# Patient Record
(demographics unavailable — no encounter records)

---

## 2025-01-14 NOTE — END OF VISIT
[FreeTextEntry3] :  All medical record entries made by the Scribe were at my, Dr. Bud Benedict, direction and personally dictated by me on 01/13/2025. I have reviewed the chart and agree that the record accurately reflects my personal performance of the history, physical exam, assessment and plan. I have also personally directed, reviewed and agreed with the chart. [Time Spent: ___ minutes] : I have spent [unfilled] minutes of time on the encounter which excludes teaching and separately reported services.

## 2025-01-14 NOTE — PHYSICAL EXAM
[No Acute Distress] : no acute distress [Normal Sclera/Conjunctiva] : normal sclera/conjunctiva [No Proptosis] : no proptosis [Normal Outer Ear/Nose] : the ears and nose were normal in appearance [Normal Oropharynx] : the oropharynx was normal [Clear to Auscultation] : lungs were clear to auscultation bilaterally [Normal Rate] : heart rate was normal [No Edema] : no peripheral edema [Soft] : abdomen soft [Spine Straight] : spine straight [No Stigmata of Cushings Syndrome] : no stigmata of Cushings Syndrome [Normal Gait] : normal gait [Normal Strength/Tone] : muscle strength and tone were normal [No Rash] : no rash [Normal Reflexes] : deep tendon reflexes were 2+ and symmetric [No Tremors] : no tremors [Oriented x3] : oriented to person, place, and time [Kyphosis] : no kyphosis present [Acanthosis Nigricans] : no acanthosis nigricans [de-identified] : Small nodularities palpated in the right thyroid lobe, regular borders.

## 2025-01-14 NOTE — PHYSICAL EXAM
[No Acute Distress] : no acute distress [Normal Sclera/Conjunctiva] : normal sclera/conjunctiva [No Proptosis] : no proptosis [Normal Outer Ear/Nose] : the ears and nose were normal in appearance [Normal Oropharynx] : the oropharynx was normal [Clear to Auscultation] : lungs were clear to auscultation bilaterally [Normal Rate] : heart rate was normal [No Edema] : no peripheral edema [Soft] : abdomen soft [Spine Straight] : spine straight [No Stigmata of Cushings Syndrome] : no stigmata of Cushings Syndrome [Normal Gait] : normal gait [Normal Strength/Tone] : muscle strength and tone were normal [No Rash] : no rash [Normal Reflexes] : deep tendon reflexes were 2+ and symmetric [No Tremors] : no tremors [Oriented x3] : oriented to person, place, and time [Kyphosis] : no kyphosis present [Acanthosis Nigricans] : no acanthosis nigricans [de-identified] : Small nodularities palpated in the right thyroid lobe, regular borders.

## 2025-01-14 NOTE — HISTORY OF PRESENT ILLNESS
[FreeTextEntry1] : 36 y/o F pt with Hx of Hashimoto hypothyroidism (dx in 2015, TPO Ab 202 in 2017) and thyroid nodules (dx in 2017).   11/17/2021 Pt presents today with /82 and BMI 30.95 for endocrine f/u. She is feeling okay with no acute complaints. Pt used to follow up with NP and presents today for regular visit.  She has gained 14 lbs in last 10 months.  Denies palpitations, weight loss and anxiety.   04/04/2023 Pt did not have any questions prior to the initiation of the telehealth visit.  CC: " I am feeling good and doing well. " Pt has recently seen her PCP Dr. Clayton, who increased pt's dose of levothyroxine to 100 mcg daily two months ago. She plans to have tubal ligation.  No speech, swallowing or breathing difficulties.   07/17/2024  Pt has /84 and BMI 30.62. No significant weight change.  CC: "I feel good." Pt follows up regularly with her PCP. She reports that she had been taking Levothyroxine 100 mcg for the past 3-4 weeks.  Small nodularities palpated in the right lobe. 01/31/19 biopsy 04/01/21 Thyroid US: right 6 mm hyperechoic, left 5 mm stable nodule. Isthmus mixed 0.8 cm nodule.   01/13/2025  Pt has /89 and BMI 30.62. No significant weight change.  CC: "I feel alright." Pt denies difficulty breathing, difficulty swallowing, voice changes, and weight changes. She takes her Levothyroxine regularly on an empty stomach.  - 07/17/24 TSH 2.73, free T4 1.3   [Medications verified as per pt on 01/13/2025] Current Medications: Levothyroxine 100 mcg daily  Imaging: - 04/01/21 Thyroid US: (Comparison to 03/05/20) 3 subcentimeter thyroid nodules. Findings: Isthmus, Normal Size in AP dimension 0.4 cm. Within the L side of the isthmus there is a mixed 0.8 x 0.4 x 0.5 cm nodule. R lobe measures 3.4 x 1.4 x 1.3 cm. There is a 6 x 5 x 6 mm stable hyperechoic nodule. L Lobe: Measures 2.6 x 0.8 x 1.3 cm. L Lower pole there is a hyperechoic 5 x 3 x 3 mm stable nodule. Impression: Normal thyroid sonogram containing an 8 mm mixed L isthmic nodule and b/l hyperechoic nodules that do not need follow-up.  - 01/31/19 FNA Biopsy by Dr. Guerrero from [facility] (ACC # [id number]): Hashimoto thyroiditis. R lobe midpole [place] II. L lobe mid pole [place] II.  - 12/06/18 Thyroid US: R lobe interpolar to lower pole solid nodule of 1.9 x 1.0 x 1.2 cm with irregular margins and L lobe a nodule in the interpolar to lower pole solid nodule 2.2 x 0.9 x 1.2 cm hypoechoic. Multiple benign lymph nodes are seen, parathyroid gland not visualized - 1/17/18 FNA Biopsy: Benign, consistent with lymphocytic Hashimoto's thyroiditis. - 11/24/17 Thyroid US: Right lobe: Hypoechoic upper pole nodule 0.7 x 0.4 x 0.5 cm; Mid pole solid hyperechoic nodule 0.6 x 0.6 x 0.6 cm; Mid pole solid complex nodule 2.0 x 1.9 x 1.7 cm. Nodules are more numerous and increase in size Left lobe: lower pole hypoechoic nodule 0.5 x 0.3 x 0.6 cm stable    Labs:  - 03/05/21: TSH 1.49, Free T4 1.4,  - 01/15/20: TSH 3.37, Free T4 1.4 - 10/30/19: TSH 5.73, Free T3 2.62, Free T4 1.2  - 01/16/19: TSH 4.64, Free T4 1.4 - 12/13/17: A1c 5.4%,.4 s.creat 0.7, TSH 3.92, Free T4 0.9, TPO ab 202, Thyroglobulin ab <1, , Cholesterol 216, LDL-c 118

## 2025-01-14 NOTE — HISTORY OF PRESENT ILLNESS
[FreeTextEntry1] : 36 y/o F pt with Hx of Hashimoto hypothyroidism (dx in 2015, TPO Ab 202 in 2017) and thyroid nodules (dx in 2017).   11/17/2021 Pt presents today with /82 and BMI 30.95 for endocrine f/u. She is feeling okay with no acute complaints. Pt used to follow up with NP and presents today for regular visit.  She has gained 14 lbs in last 10 months.  Denies palpitations, weight loss and anxiety.   04/04/2023 Pt did not have any questions prior to the initiation of the telehealth visit.  CC: " I am feeling good and doing well. " Pt has recently seen her PCP Dr. Clayton, who increased pt's dose of levothyroxine to 100 mcg daily two months ago. She plans to have tubal ligation.  No speech, swallowing or breathing difficulties.   07/17/2024  Pt has /84 and BMI 30.62. No significant weight change.  CC: "I feel good." Pt follows up regularly with her PCP. She reports that she had been taking Levothyroxine 100 mcg for the past 3-4 weeks.  Small nodularities palpated in the right lobe. 01/31/19 biopsy 04/01/21 Thyroid US: right 6 mm hyperechoic, left 5 mm stable nodule. Isthmus mixed 0.8 cm nodule.   01/13/2025  Pt has /89 and BMI 30.62. No significant weight change.  CC: "I feel alright." Pt denies difficulty breathing, difficulty swallowing, voice changes, and weight changes. She takes her Levothyroxine regularly on an empty stomach.  - 07/17/24 TSH 2.73, free T4 1.3   [Medications verified as per pt on 01/13/2025] Current Medications: Levothyroxine 100 mcg daily  Imaging: - 04/01/21 Thyroid US: (Comparison to 03/05/20) 3 subcentimeter thyroid nodules. Findings: Isthmus, Normal Size in AP dimension 0.4 cm. Within the L side of the isthmus there is a mixed 0.8 x 0.4 x 0.5 cm nodule. R lobe measures 3.4 x 1.4 x 1.3 cm. There is a 6 x 5 x 6 mm stable hyperechoic nodule. L Lobe: Measures 2.6 x 0.8 x 1.3 cm. L Lower pole there is a hyperechoic 5 x 3 x 3 mm stable nodule. Impression: Normal thyroid sonogram containing an 8 mm mixed L isthmic nodule and b/l hyperechoic nodules that do not need follow-up.  - 01/31/19 FNA Biopsy by Dr. Guerrero from [facility] (ACC # [id number]): Hashimoto thyroiditis. R lobe midpole [place] II. L lobe mid pole [place] II.  - 12/06/18 Thyroid US: R lobe interpolar to lower pole solid nodule of 1.9 x 1.0 x 1.2 cm with irregular margins and L lobe a nodule in the interpolar to lower pole solid nodule 2.2 x 0.9 x 1.2 cm hypoechoic. Multiple benign lymph nodes are seen, parathyroid gland not visualized - 1/17/18 FNA Biopsy: Benign, consistent with lymphocytic Hashimoto's thyroiditis. - 11/24/17 Thyroid US: Right lobe: Hypoechoic upper pole nodule 0.7 x 0.4 x 0.5 cm; Mid pole solid hyperechoic nodule 0.6 x 0.6 x 0.6 cm; Mid pole solid complex nodule 2.0 x 1.9 x 1.7 cm. Nodules are more numerous and increase in size Left lobe: lower pole hypoechoic nodule 0.5 x 0.3 x 0.6 cm stable    Labs:  - 03/05/21: TSH 1.49, Free T4 1.4,  - 01/15/20: TSH 3.37, Free T4 1.4 - 10/30/19: TSH 5.73, Free T3 2.62, Free T4 1.2  - 01/16/19: TSH 4.64, Free T4 1.4 - 12/13/17: A1c 5.4%,.4 s.creat 0.7, TSH 3.92, Free T4 0.9, TPO ab 202, Thyroglobulin ab <1, , Cholesterol 216, LDL-c 118